# Patient Record
Sex: FEMALE | Race: BLACK OR AFRICAN AMERICAN | Employment: FULL TIME | URBAN - METROPOLITAN AREA
[De-identification: names, ages, dates, MRNs, and addresses within clinical notes are randomized per-mention and may not be internally consistent; named-entity substitution may affect disease eponyms.]

---

## 2021-03-23 ENCOUNTER — OFFICE VISIT (OUTPATIENT)
Dept: ENT CLINIC | Age: 31
End: 2021-03-23
Payer: COMMERCIAL

## 2021-03-23 VITALS
TEMPERATURE: 97.5 F | DIASTOLIC BLOOD PRESSURE: 80 MMHG | WEIGHT: 136 LBS | OXYGEN SATURATION: 98 % | HEIGHT: 63 IN | BODY MASS INDEX: 24.1 KG/M2 | RESPIRATION RATE: 18 BRPM | SYSTOLIC BLOOD PRESSURE: 122 MMHG | HEART RATE: 77 BPM

## 2021-03-23 DIAGNOSIS — J30.1 SEASONAL ALLERGIC RHINITIS DUE TO POLLEN: ICD-10-CM

## 2021-03-23 DIAGNOSIS — R43.8 HYPOGEUSIA: ICD-10-CM

## 2021-03-23 DIAGNOSIS — R43.8 HYPOSMIA: Primary | ICD-10-CM

## 2021-03-23 DIAGNOSIS — R09.81 NASAL CONGESTION: ICD-10-CM

## 2021-03-23 PROCEDURE — 31231 NASAL ENDOSCOPY DX: CPT | Performed by: OTOLARYNGOLOGY

## 2021-03-23 PROCEDURE — 99203 OFFICE O/P NEW LOW 30 MIN: CPT | Performed by: OTOLARYNGOLOGY

## 2021-03-23 RX ORDER — AMOXICILLIN AND CLAVULANATE POTASSIUM 875; 125 MG/1; MG/1
1 TABLET, FILM COATED ORAL 2 TIMES DAILY
Qty: 20 TAB | Refills: 0 | Status: SHIPPED | OUTPATIENT
Start: 2021-03-23 | End: 2021-04-02

## 2021-03-23 RX ORDER — PREDNISONE 10 MG/1
TABLET ORAL
Qty: 24 TAB | Refills: 0 | Status: SHIPPED | OUTPATIENT
Start: 2021-03-23 | End: 2021-04-27 | Stop reason: ALTCHOICE

## 2021-03-23 NOTE — PROGRESS NOTES
Otolaryngology-Head and Neck Surgery  New Patient Visit     Patient: Javier Davis  YOB: 1990  MRN: [id number]  Date of Service: 3/23/2021    Chief Complaint:  Hyposmia, nasal congestion     History of Present Illness: Javier Davis is a 28y.o. year old female who presents today for discussion of hyposmia and hypogeusia. She notes a history of allergies, worse in the last year. Generally she is able to take an antihistamine as needed and this manages her symptoms. Usually her allergies are worse in the spring and summer. No prior allergy testing    33 days ago she developed hyposmia and hypogeusia. She has tried continuing oral antihistamines and 2 weeks ago she started Flonase. She has not noticed any improvement. Has difficulty distinguishing even sweet from salty. She is able to appreciate some sour. Has bilateral nasal congestion. Has chronic rhinorrhea and postnasal drip. Denies URI. Denies trauma. Has had multiple Covid tests which have been negative    Past Medical History:   History reviewed. No pertinent past medical history. Past Surgical History:   History reviewed. No pertinent surgical history. Medications:   Denies    Allergies:   No Known Allergies    Social History:   Social History     Tobacco Use    Smoking status: Never Smoker    Smokeless tobacco: Never Used   Substance and Sexual Activity    Alcohol use: Yes    Drug use: Never       Family History:  Family History   Problem Relation Age of Onset    Diabetes Maternal Grandmother     Stroke Maternal Grandfather        Review of Systems:    Consitutional: denies fever, excessive weight gain or loss. Eyes: denies diplopia, eye pain. Integumentary: denies new concerning skin lesions. Ears, Nose, Mouth, Throat: denies except as per HPI.   Endocrine: denies hot or cold intolerance, increased thirst.  Respiratory: denies cough, hemoptysis, wheezing  Gastrointestinal: denies trouble swallowing, nausea, emesis, regurgitation  Musculoskeletal: denies muscle weakness or wasting  Cardiovascular: denies chest pain, shortness of breath  Neurologic: denies seizures, numbness or tingling, syncope  Hematologic: denies easy bleeding or bruising    Physical Examination:   Vitals:    03/23/21 1321   BP: 122/80   BP 1 Location: Right upper arm   BP Patient Position: Sitting   BP Cuff Size: Adult   Pulse: 77   Resp: 18   Temp: 97.5 °F (36.4 °C)   TempSrc: Temporal   SpO2: 98%   Weight: 136 lb (61.7 kg)   Height: 5' 3\" (1.6 m)        General: Comfortable, pleasant, appears stated age  Voice: Strong, speaking in full sentences, no stridor    Face: No masses or lesions, facial strength symmetric   Ears: External ears unremarkable. Bilateral ear canal clear. Tympanic membrane clear and intact, with visible landmarks. Clear middle ear space  Nose: External nose unremarkable. Dorsum midline. Anterior rhinoscopy demonstrates no lesions. R septal deviation with marked R nasal congestion and mucosal edema. Septum making contact with inferior turbinate. Does not respond well to decongestant. Oral Cavity / Oropharynx: No trismus. Mucosa pink and moist. No lesions. Tongue is midline and mobile. Palate elevates symmetrically. Uvula midline. Tonsils unremarkable. Base of tongue soft. Floor of mouth soft. Neck: Supple. No adenopathy. Thyroid unremarkable. Palpable laryngeal landmarks. Full neck range of motion   Neurologic: CN II - XI intact. Normal gait    PROCEDURE: NASAL ENDOSCOPY    Preoperative Diagnosis:  Hyposmia    Postoperative Diagnosis:Same     Procedure: Nasal Endoscopy    Anesthesia: Topical 4% Lidocaine, Oxymetazoline    Description of Procedure: Verbal informed consent was obtained. After application of topical anesthetic and decongestant, the endoscope was introduced to the patient's nare. It was passed through the nose and into the nasopharynx. The scope was then withdrawn and repeated on the opposing nare.  The patient tolerated the procedure well. Findings: Septum with R deviation. Severe R nasal congestion and mucosal edema. Difficult to pass even pediatric camera. Clear rhinorrhea. No obvious polyposis or purulence but full visualization difficult. L nasal cavity with clear rhinorrhea and post nasal drip. Normal nasopharynx. Middle meatus clear on left       Assessment and Plan:   1. Hyposmia  2. Hypogeusia  3. Nasal congestion  4. Allergic rhinitis  - Significant nasal congestion and mucosal edema R > L today   - Unable to fully visualize R nasal cavity despite decongestant and use of pediatric nasal scope  - Continue flonase  - Add prednisone  - counseled on risks  - Add augmentin in case of CRS  - Check post treatment sinus CT scan  - If smell returns and symptoms improve, ok to defer CT scan  - RTC in 4 weeks even if symptoms have improved so we can discuss additional allergy Rx and maybe rescope nose             The patient was instructed to return to clinic if no improvement or progression of symptoms. Signs to watch out for reviewed.       MD [facility]  [address] [place] [address] [address]  [facility]  Office Phone: [phone]

## 2021-03-23 NOTE — PROGRESS NOTES
Visit Vitals  /80 (BP 1 Location: Right upper arm, BP Patient Position: Sitting, BP Cuff Size: Adult)   Pulse 77   Resp 18   Ht 5' 3\" (1.6 m)   Wt 136 lb (61.7 kg)   SpO2 98%   BMI 24.09 kg/m²     Chief Complaint   Patient presents with    New Patient     lose of taste and smell, runny/ stuffy nose     Allergies

## 2021-03-30 ENCOUNTER — TELEPHONE (OUTPATIENT)
Dept: ENT CLINIC | Age: 31
End: 2021-03-30

## 2021-04-27 ENCOUNTER — OFFICE VISIT (OUTPATIENT)
Dept: ENT CLINIC | Age: 31
End: 2021-04-27
Payer: COMMERCIAL

## 2021-04-27 VITALS
OXYGEN SATURATION: 97 % | HEIGHT: 64 IN | SYSTOLIC BLOOD PRESSURE: 110 MMHG | HEART RATE: 96 BPM | RESPIRATION RATE: 16 BRPM | BODY MASS INDEX: 23.6 KG/M2 | TEMPERATURE: 97.5 F | DIASTOLIC BLOOD PRESSURE: 82 MMHG | WEIGHT: 138.2 LBS

## 2021-04-27 DIAGNOSIS — J30.1 SEASONAL ALLERGIC RHINITIS DUE TO POLLEN: ICD-10-CM

## 2021-04-27 DIAGNOSIS — R09.81 NASAL CONGESTION: ICD-10-CM

## 2021-04-27 DIAGNOSIS — R43.8 HYPOSMIA: Primary | ICD-10-CM

## 2021-04-27 PROCEDURE — 99213 OFFICE O/P EST LOW 20 MIN: CPT | Performed by: OTOLARYNGOLOGY

## 2021-04-27 RX ORDER — AZELASTINE 1 MG/ML
1 SPRAY, METERED NASAL 2 TIMES DAILY
Qty: 1 BOTTLE | Refills: 1 | Status: SHIPPED | OUTPATIENT
Start: 2021-04-27 | End: 2021-09-30 | Stop reason: SDUPTHER

## 2021-04-27 NOTE — PROGRESS NOTES
Chief Complaint   Patient presents with    Follow-up     Hyposmia     Visit Vitals  /82 (BP 1 Location: Left upper arm, BP Patient Position: Sitting, BP Cuff Size: Adult)   Pulse 96   Temp 97.5 °F (36.4 °C)   Resp 16   Ht 5' 4\" (1.626 m)   Wt 138 lb 3.2 oz (62.7 kg)   SpO2 97%   BMI 23.72 kg/m²     .

## 2021-04-27 NOTE — PROGRESS NOTES
Otolaryngology-Head and Neck Surgery  Follow Up Patient Visit     Patient: Itzel Rosa  YOB: 1990  MRN: [id number]  Date of Service: 4/27/2021    Chief Complaint:  Hyposmia, nasal congestion     Interval hx: Suki Hurst presents in follow up  Notes hyposmia and hypogeusia improved immediately after the nasal scope exam   Completed prednisone and augmentin which was helpful  Did not find flonase to be helpful, and no longer finds antihistamines helpful    History of Present Illness: Itzel Rosa is a 28y.o. year old female who presents today for discussion of hyposmia and hypogeusia. She notes a history of allergies, worse in the last year. Generally she is able to take an antihistamine as needed and this manages her symptoms. Usually her allergies are worse in the spring and summer. No prior allergy testing    33 days ago she developed hyposmia and hypogeusia. She has tried continuing oral antihistamines and 2 weeks ago she started Flonase. She has not noticed any improvement. Has difficulty distinguishing even sweet from salty. She is able to appreciate some sour. Has bilateral nasal congestion. Has chronic rhinorrhea and postnasal drip. Denies URI. Denies trauma. Has had multiple Covid tests which have been negative    Past Medical History:   History reviewed. No pertinent past medical history. Past Surgical History:   History reviewed. No pertinent surgical history. Medications:   Denies    Allergies:   No Known Allergies    Social History:   Social History     Socioeconomic History    Marital status:    Tobacco Use    Smoking status: Never Smoker    Smokeless tobacco: Never Used   Substance and Sexual Activity    Alcohol use:  Yes    Drug use: Never       Family History:  Family History   Problem Relation Age of Onset    Diabetes Maternal Grandmother     Stroke Maternal Grandfather        Review of Systems:    Consitutional: denies fever, excessive weight gain or loss. Eyes: denies diplopia, eye pain. Integumentary: denies new concerning skin lesions. Ears, Nose, Mouth, Throat: denies except as per HPI. Endocrine: denies hot or cold intolerance, increased thirst.  Respiratory: denies cough, hemoptysis, wheezing  Gastrointestinal: denies trouble swallowing, nausea, emesis, regurgitation  Musculoskeletal: denies muscle weakness or wasting  Cardiovascular: denies chest pain, shortness of breath  Neurologic: denies seizures, numbness or tingling, syncope  Hematologic: denies easy bleeding or bruising    Physical Examination:   Vitals:    04/27/21 0903   BP: 110/82   BP 1 Location: Left upper arm   BP Patient Position: Sitting   BP Cuff Size: Adult   Pulse: 96   Resp: 16   Temp: 97.5 °F (36.4 °C)   SpO2: 97%   Weight: 138 lb 3.2 oz (62.7 kg)   Height: 5' 4\" (1.626 m)        General: Comfortable, pleasant, appears stated age  Voice: Strong, speaking in full sentences, no stridor    Face: No masses or lesions, facial strength symmetric   Ears: External ears unremarkable. Bilateral ear canal clear. Tympanic membrane clear and intact, with visible landmarks. Clear middle ear space  Nose: External nose unremarkable. Dorsum midline. Anterior rhinoscopy demonstrates no lesions. R septal deviation with improved R nasal congestion and mucosal edema. Septum making contact with inferior turbinate. Overall improved from LOV  Oral Cavity / Oropharynx: No trismus. Mucosa pink and moist. No lesions. Tongue is midline and mobile. Palate elevates symmetrically. Uvula midline. Tonsils unremarkable. Base of tongue soft. Floor of mouth soft. Neck: Supple. No adenopathy. Thyroid unremarkable. Palpable laryngeal landmarks. Full neck range of motion   Neurologic: CN II - XI intact. Normal gait    Assessment and Plan:   1. Hyposmia  2. Hypogeusia  3. Nasal congestion  4.  Allergic rhinitis  - Significant nasal congestion and mucosal edema R > L today   - She's improved overall so we deferred CT scan   - Use nasal saline spray before bed and add astelin nasal spray  - Discussed allergy testing and role of immunotherapy - she'd like to hold on this for now  - Otherwise follow up in 2-3 months  - If allergies improved but nasal obstruction continues, consider septoplasty turbinate reduction             The patient was instructed to return to clinic if no improvement or progression of symptoms. Signs to watch out for reviewed.       Jarad Maya MD   [facility]  [address] [place] [address]  Office Phone: [phone]

## 2021-09-30 ENCOUNTER — OFFICE VISIT (OUTPATIENT)
Dept: ENT CLINIC | Age: 31
End: 2021-09-30
Payer: COMMERCIAL

## 2021-09-30 VITALS
DIASTOLIC BLOOD PRESSURE: 66 MMHG | HEART RATE: 82 BPM | OXYGEN SATURATION: 98 % | TEMPERATURE: 97.6 F | HEIGHT: 64 IN | WEIGHT: 142 LBS | SYSTOLIC BLOOD PRESSURE: 108 MMHG | BODY MASS INDEX: 24.24 KG/M2 | RESPIRATION RATE: 16 BRPM

## 2021-09-30 DIAGNOSIS — J32.4 CHRONIC PANSINUSITIS: ICD-10-CM

## 2021-09-30 DIAGNOSIS — R43.8 HYPOSMIA: Primary | ICD-10-CM

## 2021-09-30 PROCEDURE — 99213 OFFICE O/P EST LOW 20 MIN: CPT | Performed by: OTOLARYNGOLOGY

## 2021-09-30 RX ORDER — AZELASTINE 1 MG/ML
1 SPRAY, METERED NASAL 2 TIMES DAILY
Qty: 1 EACH | Refills: 1 | Status: SHIPPED | OUTPATIENT
Start: 2021-09-30 | End: 2022-05-11 | Stop reason: SDUPTHER

## 2021-09-30 NOTE — PROGRESS NOTES
Visit Vitals  /66 (BP 1 Location: Left upper arm, BP Patient Position: Sitting, BP Cuff Size: Adult)   Pulse 82   Temp 97.6 °F (36.4 °C) (Temporal)   Resp 16   Ht 5' 4\" (1.626 m)   Wt 142 lb (64.4 kg)   SpO2 98%   BMI 24.37 kg/m²   1. Have you been to the ER, urgent care clinic since your last visit? Hospitalized since your last visit? No    2. Have you seen or consulted any other health care providers outside of the [address] [place] [address] since your last visit? Include any pap smears or colon screening. Yes Where: ob/gyn doctor-Dr Shannon Gould Reason for visit: OB/GYN doctor-Dr Shannon Gould   Chief Complaint   Patient presents with    Sinus Infection     runny nose and congestion. Lost sense of taste and smell recently, but better today.

## 2021-09-30 NOTE — PROGRESS NOTES
Otolaryngology-Head and Neck Surgery  Follow Up Patient Visit     Patient: Digna Song  YOB: 1990  MRN: [id number]  Date of Service: 9/30/2021    Chief Complaint:  Hyposmia, nasal congestion     Interval hx: Amber Sharpe was previously seen a few months ago with chronic hyposmia, which improved with nasal endoscopy, as well as prednisone//augmentin    She notes generally doing well until she was on a flight a few weeks ago and again had sudden loss of smell  Had COVID testing which was negative    Continues to have nasal congestion, R > L  Taste and smell are since improving    History of Present Illness: Digna Song is a 28y.o. year old female who presents today for discussion of hyposmia and hypogeusia. She notes a history of allergies, worse in the last year. Generally she is able to take an antihistamine as needed and this manages her symptoms. Usually her allergies are worse in the spring and summer. No prior allergy testing    33 days ago she developed hyposmia and hypogeusia. She has tried continuing oral antihistamines and 2 weeks ago she started Flonase. She has not noticed any improvement. Has difficulty distinguishing even sweet from salty. She is able to appreciate some sour. Has bilateral nasal congestion. Has chronic rhinorrhea and postnasal drip. Denies URI. Denies trauma. Has had multiple Covid tests which have been negative    Past Medical History:   No past medical history on file. Past Surgical History:   No past surgical history on file. Medications:   Denies    Allergies:   No Known Allergies    Social History:   Social History     Socioeconomic History    Marital status:    Tobacco Use    Smoking status: Never Smoker    Smokeless tobacco: Never Used   Substance and Sexual Activity    Alcohol use:  Yes    Drug use: Never       Family History:  Family History   Problem Relation Age of Onset    Diabetes Maternal Grandmother     Stroke Maternal Grandfather        Review of Systems:    Consitutional: denies fever, excessive weight gain or loss. Eyes: denies diplopia, eye pain. Integumentary: denies new concerning skin lesions. Ears, Nose, Mouth, Throat: denies except as per HPI. Endocrine: denies hot or cold intolerance, increased thirst.  Respiratory: denies cough, hemoptysis, wheezing  Gastrointestinal: denies trouble swallowing, nausea, emesis, regurgitation  Musculoskeletal: denies muscle weakness or wasting  Cardiovascular: denies chest pain, shortness of breath  Neurologic: denies seizures, numbness or tingling, syncope  Hematologic: denies easy bleeding or bruising    Physical Examination:   Vitals:    09/30/21 1020   BP: 108/66   BP 1 Location: Left upper arm   BP Patient Position: Sitting   BP Cuff Size: Adult   Pulse: 82   Temp: 97.6 °F (36.4 °C)   TempSrc: Temporal   Resp: 16   Height: 5' 4\" (1.626 m)   Weight: 142 lb (64.4 kg)   SpO2: 98%        General: Comfortable, pleasant, appears stated age  Voice: Strong, speaking in full sentences, no stridor    Face: No masses or lesions, facial strength symmetric   Ears: External ears unremarkable. Bilateral ear canal clear. Tympanic membrane clear and intact, with visible landmarks. Clear middle ear space  Nose: External nose unremarkable. Dorsum midline. Anterior rhinoscopy demonstrates no lesions. R septal deviation with improved R nasal congestion and mucosal edema. Septum making contact with inferior turbinate. Oral Cavity / Oropharynx: No trismus. Mucosa pink and moist. No lesions. Tongue is midline and mobile. Palate elevates symmetrically. Uvula midline. Tonsils unremarkable. Base of tongue soft. Floor of mouth soft. Neck: Supple. No adenopathy. Thyroid unremarkable. Palpable laryngeal landmarks. Full neck range of motion   Neurologic: CN II - XI intact. Normal gait    Assessment and Plan:   1. Hyposmia  2. Hypogeusia  3. Nasal congestion  4.  Allergic rhinitis  - R septal deviation with nasal congestion and turbinate hypertrophy  - Cont PRN nasal saline and astelin  - Discussed given recurrence of hyposmia despite above, and as she's already had PO antibiotics/steroids, will check CT sinus  - Discussed allergy testing and role of immunotherapy - which she will consider           The patient was instructed to return to clinic if no improvement or progression of symptoms. Signs to watch out for reviewed.       MD [facility]  [address] [place] [address] [address]  [facility]  Office Phone: [phone]

## 2022-05-11 ENCOUNTER — TELEPHONE (OUTPATIENT)
Dept: ENT CLINIC | Age: 32
End: 2022-05-11

## 2022-05-11 DIAGNOSIS — J30.1 SEASONAL ALLERGIC RHINITIS DUE TO POLLEN: Primary | ICD-10-CM

## 2022-05-11 RX ORDER — AZELASTINE 1 MG/ML
1 SPRAY, METERED NASAL 2 TIMES DAILY
Qty: 1 EACH | Refills: 1 | Status: SHIPPED | OUTPATIENT
Start: 2022-05-11

## 2022-05-11 NOTE — TELEPHONE ENCOUNTER
Pt called to make an appt with Dr. Gabino Ballard. She states that she is still having the same issue that she was previously seen for. Lot of congestion, allergy issues and difficulty breathing. The pt made an appt for next available and then was also added to the cancellation list.   However, the pt was wondering if there was a medication she could use in the meantime. She states that Dr. Gabino Ballard prescribed a nasal spray previously and she was wondering if she could fill that RX again to try in the meantime. Please advise.

## 2022-06-27 ENCOUNTER — OFFICE VISIT (OUTPATIENT)
Dept: ENT CLINIC | Age: 32
End: 2022-06-27
Payer: COMMERCIAL

## 2022-06-27 VITALS
WEIGHT: 142 LBS | BODY MASS INDEX: 24.24 KG/M2 | DIASTOLIC BLOOD PRESSURE: 60 MMHG | RESPIRATION RATE: 16 BRPM | HEART RATE: 70 BPM | TEMPERATURE: 97 F | SYSTOLIC BLOOD PRESSURE: 100 MMHG | OXYGEN SATURATION: 100 % | HEIGHT: 64 IN

## 2022-06-27 DIAGNOSIS — R43.8 HYPOSMIA: Primary | ICD-10-CM

## 2022-06-27 DIAGNOSIS — J30.1 SEASONAL ALLERGIC RHINITIS DUE TO POLLEN: ICD-10-CM

## 2022-06-27 DIAGNOSIS — J32.4 CHRONIC PANSINUSITIS: ICD-10-CM

## 2022-06-27 DIAGNOSIS — R09.81 NASAL CONGESTION: ICD-10-CM

## 2022-06-27 PROCEDURE — 99213 OFFICE O/P EST LOW 20 MIN: CPT | Performed by: OTOLARYNGOLOGY

## 2022-06-27 RX ORDER — PREDNISONE 10 MG/1
TABLET ORAL
Qty: 24 TABLET | Refills: 0 | Status: SHIPPED | OUTPATIENT
Start: 2022-06-27 | End: 2022-08-01 | Stop reason: ALTCHOICE

## 2022-06-27 RX ORDER — CETIRIZINE HCL 10 MG
10 TABLET ORAL DAILY
Qty: 90 TABLET | Refills: 1 | Status: SHIPPED | OUTPATIENT
Start: 2022-06-27 | End: 2022-08-22 | Stop reason: SDUPTHER

## 2022-06-27 RX ORDER — AMOXICILLIN AND CLAVULANATE POTASSIUM 875; 125 MG/1; MG/1
1 TABLET, FILM COATED ORAL 2 TIMES DAILY
Qty: 20 TABLET | Refills: 0 | Status: SHIPPED | OUTPATIENT
Start: 2022-06-27 | End: 2022-07-07

## 2022-06-27 NOTE — PROGRESS NOTES
Otolaryngology-Head and Neck Surgery  Follow Up Patient Visit     Patient: Nathen Moran  YOB: 1990  MRN: [id number]  Date of Service: 6/27/2022    Chief Complaint:  Hyposmia, nasal congestion       Interval hx:   6 weeks ago or so, worsening nasal congestion   Not able to breathe at night causing anxiety   Dec smell , taste again   Using astelin for the last few weeks with some improvement - using PRN     Interval hx: Cooper Melara was previously seen a few months ago with chronic hyposmia, which improved with nasal endoscopy, as well as prednisone//augmentin    She notes generally doing well until she was on a flight a few weeks ago and again had sudden loss of smell  Had COVID testing which was negative    Continues to have nasal congestion, R > L  Taste and smell are since improving    History of Present Illness: Nathen Moran is a 33y.o. year old female who presents today for discussion of hyposmia and hypogeusia. She notes a history of allergies, worse in the last year. Generally she is able to take an antihistamine as needed and this manages her symptoms. Usually her allergies are worse in the spring and summer. No prior allergy testing    33 days ago she developed hyposmia and hypogeusia. She has tried continuing oral antihistamines and 2 weeks ago she started Flonase. She has not noticed any improvement. Has difficulty distinguishing even sweet from salty. She is able to appreciate some sour. Has bilateral nasal congestion. Has chronic rhinorrhea and postnasal drip. Denies URI. Denies trauma. Has had multiple Covid tests which have been negative    Past Medical History:   History reviewed. No pertinent past medical history. Past Surgical History:   History reviewed. No pertinent surgical history.     Medications:   Denies    Allergies:   No Known Allergies    Social History:   Social History     Socioeconomic History    Marital status:    Tobacco Use    Smoking status: Never Smoker    Smokeless tobacco: Never Used   Substance and Sexual Activity    Alcohol use: Yes    Drug use: Never       Family History:  Family History   Problem Relation Age of Onset    Diabetes Maternal Grandmother     Stroke Maternal Grandfather        Review of Systems:    Consitutional: denies fever, excessive weight gain or loss. Eyes: denies diplopia, eye pain. Integumentary: denies new concerning skin lesions. Ears, Nose, Mouth, Throat: denies except as per HPI. Endocrine: denies hot or cold intolerance, increased thirst.  Respiratory: denies cough, hemoptysis, wheezing  Gastrointestinal: denies trouble swallowing, nausea, emesis, regurgitation  Musculoskeletal: denies muscle weakness or wasting  Cardiovascular: denies chest pain, shortness of breath  Neurologic: denies seizures, numbness or tingling, syncope  Hematologic: denies easy bleeding or bruising    Physical Examination:   Vitals:    06/27/22 1621   BP: 100/60   BP 1 Location: Right upper arm   BP Patient Position: Sitting   BP Cuff Size: Large adult   Pulse: 70   Temp: 97 °F (36.1 °C)   TempSrc: Temporal   Resp: 16   Height: 5' 4\" (1.626 m)   Weight: 142 lb (64.4 kg)   SpO2: 100%        General: Comfortable, pleasant, appears stated age  Voice: Strong, speaking in full sentences, no stridor    Face: No masses or lesions, facial strength symmetric   Ears: External ears unremarkable. Bilateral ear canal clear. Tympanic membrane clear and intact, with visible landmarks. Clear middle ear space  Nose: External nose unremarkable. Dorsum midline. Anterior rhinoscopy demonstrates no lesions. Bilateral inferior turbinate hypertrophy and mucosal edema  Oral Cavity / Oropharynx: No trismus. Mucosa pink and moist. No lesions. Tongue is midline and mobile. Palate elevates symmetrically. Uvula midline. Tonsils unremarkable. Base of tongue soft. Floor of mouth soft. Neck: Supple. No adenopathy. Thyroid unremarkable.  Palpable laryngeal landmarks. Full neck range of motion   Neurologic: CN II - XI intact. Normal gait    Assessment and Plan:   1. Hyposmia  2. Hypogeusia  3. Nasal congestion  4. Allergic rhinitis  - Use nasal saline PRN   - Use astelin daily  - Add PO prednisone, augmentin given severity of symptoms  - Check CT sinus   - Pending above, consider allergy testing and IT if CT negative   - Can also consider in office turb reduction, possible vivaer procedure           The patient was instructed to return to clinic if no improvement or progression of symptoms. Signs to watch out for reviewed.       MD [facility]  [address] [place] [address]  [facility]  Office Phone: [phone]

## 2022-07-13 ENCOUNTER — HOSPITAL ENCOUNTER (OUTPATIENT)
Dept: CT IMAGING | Age: 32
Discharge: HOME | End: 2022-07-13
Attending: OTOLARYNGOLOGY
Payer: COMMERCIAL

## 2022-07-13 DIAGNOSIS — R43.8 HYPOSMIA: ICD-10-CM

## 2022-07-13 DIAGNOSIS — J32.4 CHRONIC PANSINUSITIS: ICD-10-CM

## 2022-07-13 PROCEDURE — 70486 CT MAXILLOFACIAL W/O DYE: CPT

## 2022-07-15 ENCOUNTER — TELEPHONE (OUTPATIENT)
Dept: ENT CLINIC | Age: 32
End: 2022-07-15

## 2022-07-15 NOTE — TELEPHONE ENCOUNTER
I tried calling the patient with results. Left VM reminding patient of her follow up appointment in 2 weeks. Advised patient that she can call back to the office if she wants results before her next appointment.

## 2022-07-15 NOTE — TELEPHONE ENCOUNTER
----- Message from Bethel Fajardo MD sent at 7/15/2022 11:29 AM EDT -----  Can you let pt know sinus CT shows mild sinus inflammation but overall looks pretty good  She has an appt with me in a coule weeks to go over things further and discuss options for next steps      Thanks  SL  ----- Message -----  From: Enoc Vaca Results In  Sent: 7/13/2022   1:25 PM EDT  To: Bethel Fajardo MD

## 2022-07-27 ENCOUNTER — TELEPHONE (OUTPATIENT)
Dept: ENT CLINIC | Age: 32
End: 2022-07-27

## 2022-07-27 NOTE — TELEPHONE ENCOUNTER
LVM asking pt to call the office and r/s appt due to Dr. Caty Valdes having an emergency at the hospital

## 2022-07-29 ENCOUNTER — TELEPHONE (OUTPATIENT)
Dept: ENT CLINIC | Age: 32
End: 2022-07-29

## 2022-07-29 NOTE — TELEPHONE ENCOUNTER
Attempted to reach [facility] to confirm next appointment and left a voicemail asking the patient to call back to confirm.

## 2022-08-01 ENCOUNTER — OFFICE VISIT (OUTPATIENT)
Dept: ENT CLINIC | Age: 32
End: 2022-08-01
Payer: COMMERCIAL

## 2022-08-01 VITALS
HEIGHT: 64 IN | SYSTOLIC BLOOD PRESSURE: 112 MMHG | BODY MASS INDEX: 26.46 KG/M2 | HEART RATE: 88 BPM | DIASTOLIC BLOOD PRESSURE: 70 MMHG | OXYGEN SATURATION: 98 % | WEIGHT: 155 LBS | RESPIRATION RATE: 16 BRPM

## 2022-08-01 DIAGNOSIS — J34.2 NASAL SEPTAL DEVIATION: ICD-10-CM

## 2022-08-01 DIAGNOSIS — R09.81 NASAL CONGESTION: ICD-10-CM

## 2022-08-01 DIAGNOSIS — H61.22 LEFT EAR IMPACTED CERUMEN: ICD-10-CM

## 2022-08-01 DIAGNOSIS — R43.8 HYPOSMIA: Primary | ICD-10-CM

## 2022-08-01 PROCEDURE — 99213 OFFICE O/P EST LOW 20 MIN: CPT | Performed by: OTOLARYNGOLOGY

## 2022-08-01 NOTE — PROGRESS NOTES
Otolaryngology-Head and Neck Surgery  Follow Up Patient Visit     Patient: Allan Devi  YOB: 1990  MRN: [id number]  Date of Service: 8/1/2022    Chief Complaint:  Hyposmia, nasal congestion     Interval hx: 8/1/2022  Here to follow up after sinus CT  Feels back to normal, able to breathe and smell comfortably     Interval hx: 6/27/2022  6 weeks ago or so, worsening nasal congestion   Not able to breathe at night causing anxiety   Dec smell , taste again   Using astelin for the last few weeks with some improvement - using PRN     Interval hx: 9/2021  Lucrecia Giles was previously seen a few months ago with chronic hyposmia, which improved with nasal endoscopy, as well as prednisone//augmentin    She notes generally doing well until she was on a flight a few weeks ago and again had sudden loss of smell  Had COVID testing which was negative    Continues to have nasal congestion, R > L  Taste and smell are since improving    History of Present Illness: Allan Devi is a 33y.o. year old female who presents today for discussion of hyposmia and hypogeusia. She notes a history of allergies, worse in the last year. Generally she is able to take an antihistamine as needed and this manages her symptoms. Usually her allergies are worse in the spring and summer. No prior allergy testing    33 days ago she developed hyposmia and hypogeusia. She has tried continuing oral antihistamines and 2 weeks ago she started Flonase. She has not noticed any improvement. Has difficulty distinguishing even sweet from salty. She is able to appreciate some sour. Has bilateral nasal congestion. Has chronic rhinorrhea and postnasal drip. Denies URI. Denies trauma. Has had multiple Covid tests which have been negative    Past Medical History:   No past medical history on file. Past Surgical History:   No past surgical history on file.     Medications:   Denies    Allergies:   No Known Allergies    Social History:   Social History     Socioeconomic History    Marital status:    Tobacco Use    Smoking status: Never Smoker    Smokeless tobacco: Never Used   Substance and Sexual Activity    Alcohol use: Yes    Drug use: Never       Family History:  Family History   Problem Relation Age of Onset    Diabetes Maternal Grandmother     Stroke Maternal Grandfather        Review of Systems:    Consitutional: denies fever, excessive weight gain or loss. Eyes: denies diplopia, eye pain. Integumentary: denies new concerning skin lesions. Ears, Nose, Mouth, Throat: denies except as per HPI. Endocrine: denies hot or cold intolerance, increased thirst.  Respiratory: denies cough, hemoptysis, wheezing  Gastrointestinal: denies trouble swallowing, nausea, emesis, regurgitation  Musculoskeletal: denies muscle weakness or wasting  Cardiovascular: denies chest pain, shortness of breath  Neurologic: denies seizures, numbness or tingling, syncope  Hematologic: denies easy bleeding or bruising    Physical Examination:   There were no vitals filed for this visit. General: Comfortable, pleasant, appears stated age  Voice: Strong, speaking in full sentences, no stridor    Face: No masses or lesions, facial strength symmetric   Ears: External ears unremarkable. Left cerumen impaction, following debridement, Tympanic membrane clear and intact, with visible landmarks. Clear middle ear space  Nose: External nose unremarkable. Dorsum midline. Anterior rhinoscopy demonstrates no lesions. Bilateral inferior turbinate hypertrophy and mucosal edema  Oral Cavity / Oropharynx: No trismus. Mucosa pink and moist. No lesions. Tongue is midline and mobile. Palate elevates symmetrically. Uvula midline. Tonsils unremarkable. Base of tongue soft. Floor of mouth soft. Neck: Supple. No adenopathy. Thyroid unremarkable. Palpable laryngeal landmarks. Full neck range of motion   Neurologic: CN II - XI intact.  Normal gait      CT sinus IMPRESSION  1. Minimal mucosal thickening in the maxillary sinuses. Otherwise clear  paranasal sinuses. 2. Rightward deviated nasal septum. PROCEDURE: BILATERAL MICROSCOPY WITH CERUMEN DEBRIDEMENT    Using a headlight and otoscope, both ears were examined. A 5 Fr suction and alligator were used to debride the ears of cerumen revealing a clear and intact TM bilaterally. Patient tolerated the procedure well     Assessment and Plan:   Hyposmia  Hypogeusia  Nasal congestion  Allergic rhinitis  Left cerumen impaction   - Use nasal saline PRN   - Use astelin daily  - Reviewed sinus CT results  - Discussed options of in office turbinate reduction, septoplasty, turbinate reduction and maxilary antrostomy, as well as allergy testing  - For now as she is doing well, will cont astelin  - If recurring flare ups discussed need for additional steps  - Left ear debrided of cerumen     The patient was instructed to return to clinic if no improvement or progression of symptoms. Signs to watch out for reviewed.       MD Matt Cardenasova [facility]  [address] [place] [address] 6  [place]  Office Phone: [phone]

## 2022-08-01 NOTE — PROGRESS NOTES
Visit Vitals  /70 (BP 1 Location: Left upper arm, BP Patient Position: Sitting, BP Cuff Size: Adult)   Pulse 88   Resp 16   Ht 5' 4\" (1.626 m)   Wt 155 lb (70.3 kg)   SpO2 98%   BMI 26.61 kg/m²     Chief Complaint   Patient presents with    Follow-up     Talk on CT Results / Hyposmia

## 2022-08-22 DIAGNOSIS — R09.81 NASAL CONGESTION: Primary | ICD-10-CM

## 2022-08-22 RX ORDER — CETIRIZINE HCL 10 MG
10 TABLET ORAL DAILY
Qty: 90 TABLET | Refills: 1 | Status: SHIPPED | OUTPATIENT
Start: 2022-08-22

## 2022-11-02 ENCOUNTER — HOSPITAL ENCOUNTER (EMERGENCY)
Age: 32
Discharge: HOME | End: 2022-11-02
Attending: EMERGENCY MEDICINE
Payer: COMMERCIAL

## 2022-11-02 VITALS
RESPIRATION RATE: 22 BRPM | WEIGHT: 142 LBS | BODY MASS INDEX: 24.24 KG/M2 | TEMPERATURE: 98.2 F | OXYGEN SATURATION: 99 % | DIASTOLIC BLOOD PRESSURE: 74 MMHG | HEIGHT: 64 IN | HEART RATE: 79 BPM | SYSTOLIC BLOOD PRESSURE: 118 MMHG

## 2022-11-02 DIAGNOSIS — R51.9 ACUTE NONINTRACTABLE HEADACHE, UNSPECIFIED HEADACHE TYPE: Primary | ICD-10-CM

## 2022-11-02 LAB
ALBUMIN SERPL-MCNC: 4.4 G/DL (ref 3.5–5)
ALBUMIN/GLOB SERPL: 1.5 {RATIO} (ref 1.1–2.2)
ALP SERPL-CCNC: 40 U/L (ref 45–117)
ALT SERPL-CCNC: 18 U/L (ref 12–78)
ANION GAP SERPL CALC-SCNC: 7 MMOL/L (ref 5–15)
ARTERIAL PATENCY WRIST A: ABNORMAL
AST SERPL W P-5'-P-CCNC: 16 U/L (ref 15–37)
BASE EXCESS BLDA CALC-SCNC: 2.1 MMOL/L (ref 0–3)
BASOPHILS # BLD: 0 K/UL (ref 0–0.1)
BASOPHILS NFR BLD: 1 % (ref 0–1)
BDY SITE: ABNORMAL
BILIRUB SERPL-MCNC: 0.4 MG/DL (ref 0.2–1)
BODY TEMPERATURE: 97.8
BUN SERPL-MCNC: 9 MG/DL (ref 6–20)
BUN/CREAT SERPL: 11 (ref 12–20)
CA-I BLD-MCNC: 9.1 MG/DL (ref 8.5–10.1)
CHLORIDE SERPL-SCNC: 106 MMOL/L (ref 97–108)
CO2 SERPL-SCNC: 26 MMOL/L (ref 21–32)
COHGB MFR BLD: 0.9 % (ref 1–2)
CREAT SERPL-MCNC: 0.82 MG/DL (ref 0.55–1.02)
DIFFERENTIAL METHOD BLD: ABNORMAL
EOSINOPHIL # BLD: 0.2 K/UL (ref 0–0.4)
EOSINOPHIL NFR BLD: 3 % (ref 0–7)
ERYTHROCYTE [DISTWIDTH] IN BLOOD BY AUTOMATED COUNT: 11.2 % (ref 11.5–14.5)
GLOBULIN SER CALC-MCNC: 3 G/DL (ref 2–4)
GLUCOSE SERPL-MCNC: 106 MG/DL (ref 65–100)
HCG UR QL: NEGATIVE
HCO3 BLDA-SCNC: 28 MMOL/L (ref 22–26)
HCT VFR BLD AUTO: 36.3 % (ref 35–47)
HGB BLD-MCNC: 12.1 G/DL (ref 11.5–16)
IMM GRANULOCYTES # BLD AUTO: 0 K/UL (ref 0–0.04)
IMM GRANULOCYTES NFR BLD AUTO: 0 % (ref 0–0.5)
LYMPHOCYTES # BLD: 1.9 K/UL (ref 0.8–3.5)
LYMPHOCYTES NFR BLD: 32 % (ref 12–49)
MCH RBC QN AUTO: 32.3 PG (ref 26–34)
MCHC RBC AUTO-ENTMCNC: 33.3 G/DL (ref 30–36.5)
MCV RBC AUTO: 96.8 FL (ref 80–99)
METHGB MFR BLD: 0.4 % (ref 0–1.4)
MONOCYTES # BLD: 0.3 K/UL (ref 0–1)
MONOCYTES NFR BLD: 5 % (ref 5–13)
NEUTS SEG # BLD: 3.4 K/UL (ref 1.8–8)
NEUTS SEG NFR BLD: 59 % (ref 32–75)
NRBC # BLD: 0 K/UL (ref 0–0.01)
NRBC BLD-RTO: 0 PER 100 WBC
OXYHGB MFR BLD: 97.3 % (ref 95–99)
PCO2 BLDA: 45 MMHG (ref 35–45)
PERFORMED BY, TECHID: ABNORMAL
PH BLDA: 7.4 [PH] (ref 7.35–7.45)
PLATELET # BLD AUTO: 337 K/UL (ref 150–400)
PMV BLD AUTO: 8.8 FL (ref 8.9–12.9)
PO2 BLDA: 174 MMHG (ref 80–100)
POTASSIUM SERPL-SCNC: 3.4 MMOL/L (ref 3.5–5.1)
PROT SERPL-MCNC: 7.4 G/DL (ref 6.4–8.2)
RBC # BLD AUTO: 3.75 M/UL (ref 3.8–5.2)
SAO2 % BLD: 99 % (ref 95–99)
SAO2% DEVICE SAO2% SENSOR NAME: ABNORMAL
SODIUM SERPL-SCNC: 139 MMOL/L (ref 136–145)
SPECIMEN SITE: ABNORMAL
WBC # BLD AUTO: 5.8 K/UL (ref 3.6–11)

## 2022-11-02 PROCEDURE — 82803 BLOOD GASES ANY COMBINATION: CPT

## 2022-11-02 PROCEDURE — 99283 EMERGENCY DEPT VISIT LOW MDM: CPT

## 2022-11-02 PROCEDURE — 81025 URINE PREGNANCY TEST: CPT

## 2022-11-02 PROCEDURE — 85025 COMPLETE CBC W/AUTO DIFF WBC: CPT

## 2022-11-02 PROCEDURE — 80053 COMPREHEN METABOLIC PANEL: CPT

## 2022-11-02 PROCEDURE — 36415 COLL VENOUS BLD VENIPUNCTURE: CPT

## 2022-11-02 NOTE — ED TRIAGE NOTES
Pt states her gas stove was on for an unknown amount of time. States she woke up feeling light headed and had a headache. States she would like to be checked for carbon monoxide poisoning.

## 2022-11-03 NOTE — ED PROVIDER NOTES
EMERGENCY DEPARTMENT HISTORY AND PHYSICAL EXAM      Date: 11/2/2022  Patient Name: [facility]    History of Presenting Illness     Chief Complaint   Patient presents with    Headache       History Provided By: Patient    HPI: [facility], 33 y.o. female with no significant past medical history presenting to the emergency department for evaluation of right sided frontal headache starting this morning. Patient states that she noted the gas on her stove to be running. She is unsure how long it was on. Patient does not live with anyone at home. Reports associated nausea, however denies any other symptoms. States that she has taken over-the-counter medication with moderate improvement of her symptoms, however is concerned about carbon monoxide exposure. There are no other complaints, changes, or physical findings at this time. PCP: None    No current facility-administered medications on file prior to encounter. Current Outpatient Medications on File Prior to Encounter   Medication Sig Dispense Refill    cetirizine (ZYRTEC) 10 mg tablet Take 1 Tablet by mouth daily. 90 Tablet 1    azelastine (ASTELIN) 137 mcg (0.1 %) nasal spray 1 [place] by Both Nostrils route two (2) times a day. Use in each nostril as directed 1 Each 1       Past History     Past Medical History:  History reviewed. No pertinent past medical history. Past Surgical History:  History reviewed. No pertinent surgical history. Family History:  Family History   Problem Relation Age of Onset    Diabetes Maternal Grandmother     Stroke Maternal Grandfather        Social History:  Social History     Tobacco Use    Smoking status: Never    Smokeless tobacco: Never   Vaping Use    Vaping Use: Never used   Substance Use Topics    Alcohol use: Yes    Drug use: Never       Allergies:  No Known Allergies    Review of Systems   Review of Systems   Constitutional:  Negative for chills and fever.    HENT:  Negative for congestion and sore throat. Eyes:  Negative for pain and visual disturbance. Respiratory:  Negative for cough and shortness of breath. Cardiovascular:  Negative for chest pain and palpitations. Gastrointestinal:  Positive for nausea. Negative for constipation, diarrhea and vomiting. Genitourinary:  Negative for dysuria and frequency. Musculoskeletal:  Negative for arthralgias and myalgias. Skin:  Negative for color change and rash. Neurological:  Positive for headaches. Negative for dizziness and weakness. Psychiatric/Behavioral:  Negative for dysphoric mood and sleep disturbance. Physical Exam   Physical Exam  Constitutional:       Appearance: Normal appearance. HENT:      Head: Normocephalic and atraumatic. Right Ear: External ear normal.      Left Ear: External ear normal.      Nose: Nose normal.      Mouth/Throat:      Mouth: Mucous membranes are moist.   Eyes:      Extraocular Movements: Extraocular movements intact. Conjunctiva/sclera: Conjunctivae normal.   Cardiovascular:      Rate and Rhythm: Normal rate and regular rhythm. Pulses: Normal pulses. Heart sounds: Normal heart sounds. Pulmonary:      Effort: Pulmonary effort is normal.      Breath sounds: Normal breath sounds. Abdominal:      General: Abdomen is flat. There is no distension. Palpations: Abdomen is soft. Tenderness: There is no abdominal tenderness. Musculoskeletal:         General: Normal range of motion. Cervical back: Normal range of motion. Skin:     General: Skin is warm and dry. Capillary Refill: Capillary refill takes less than 2 seconds. Neurological:      General: No focal deficit present. Mental Status: She is alert and oriented to person, place, and time. Mental status is at baseline.    Psychiatric:         Mood and Affect: Mood normal.         Behavior: Behavior normal.       Lab and Diagnostic Study Results   Labs -   No results found for this or any previous visit (from the past 12 hour(s)). Radiologic Studies -   @lastxrresult@  CT Results  (Last 48 hours)      None          CXR Results  (Last 48 hours)      None            Medical Decision Making and ED Course   Differential Diagnosis & Medical Decision Making Provider Note:   44-year-old female presenting for evaluation of headache in the setting of possible her monoxide exposure. Patient states that headache is improved significantly over the course of the day after over-the-counter medications. Patient has no findings on neuro exam.  She is placed on nonrebreather on arrival to the emergency department. CBC, CMP, carboxyhemoglobin levels without significant abnormality. hCG negative. Patient with complete resolution of symptoms while in the emergency department. Stable for discharge home at this time. - I am the first provider for this patient. I reviewed the vital signs, available nursing notes, past medical history, past surgical history, family history and social history. The patients presenting problems have been discussed, and they are in agreement with the care plan formulated and outlined with them. I have encouraged them to ask questions as they arise throughout their visit. Vital Signs-Reviewed the patient's vital signs. No data found. ED Course:          Procedures   Performed by: Anastasiia Berger DO  Procedures      Disposition   Disposition: Condition stable  DC- Adult Discharges: All of the diagnostic tests were reviewed and questions answered. Diagnosis, care plan and treatment options were discussed. The patient understands the instructions and will follow up as directed. The patients results have been reviewed with them. They have been counseled regarding their diagnosis. The patient verbally convey understanding and agreement of the signs, symptoms, diagnosis, treatment and prognosis and additionally agrees to follow up as recommended with their PCP in 24 - 48 hours.   They also agree with the care-plan and convey that all of their questions have been answered. I have also put together some discharge instructions for them that include: 1) educational information regarding their diagnosis, 2) how to care for their diagnosis at home, as well a 3) list of reasons why they would want to return to the ED prior to their follow-up appointment, should their condition change. DC-The patient was given verbal follow-up instructions    DISCHARGE PLAN:  1. Cannot display discharge medications since this patient is not currently admitted. 2.   Follow-up Information       Follow up With Specialties Details Why [facility] EMERGENCY DEPT Emergency Medicine  As needed, If symptoms worsen [address] [facility] [address]  [phone]          3. Return to ED if worse   4. Discharge Medication List as of 11/2/2022 10:02 PM         Remove if admitted/transferred    Diagnosis/Clinical Impression     Clinical Impression:   1. Acute nonintractable headache, unspecified headache type        Attestations: Adams Crigler, , am the primary clinician of record. Please note that this dictation was completed with [email or web address], the [employer] voice recognition software. Quite often unanticipated grammatical, syntax, homophones, and other interpretive errors are inadvertently transcribed by the computer software. Please disregard these errors. Please excuse any errors that have escaped final proofreading. Thank you.

## 2022-11-03 NOTE — DISCHARGE INSTRUCTIONS
Thank you! Thank you for allowing me to care for you in the emergency department. It is my goal to provide you with excellent care. If you have not received excellent quality care, please ask to speak to the nurse manager. Please fill out the survey that will come to you by mail or email since we listen to your feedback! Below you will find a list of your tests from today's visit. Should you have any questions, please do not hesitate to call the emergency department. Labs  Recent Results (from the past 12 hour(s))   CBC WITH AUTOMATED DIFF    Collection Time: 11/02/22  7:58 PM   Result Value Ref Range    WBC 5.8 3.6 - 11.0 K/uL    RBC 3.75 (L) 3.80 - 5.20 M/uL    HGB 12.1 11.5 - 16.0 g/dL    HCT 36.3 35.0 - 47.0 %    MCV 96.8 80.0 - 99.0 FL    MCH 32.3 26.0 - 34.0 PG    MCHC 33.3 30.0 - 36.5 g/dL    RDW 11.2 (L) 11.5 - 14.5 %    PLATELET 794 043 - 691 K/uL    MPV 8.8 (L) 8.9 - 12.9 FL    NRBC 0.0 0.0  WBC    ABSOLUTE NRBC 0.00 0.00 - 0.01 K/uL    NEUTROPHILS 59 32 - 75 %    LYMPHOCYTES 32 12 - 49 %    MONOCYTES 5 5 - 13 %    EOSINOPHILS 3 0 - 7 %    BASOPHILS 1 0 - 1 %    IMMATURE GRANULOCYTES 0 0 - 0.5 %    ABS. NEUTROPHILS 3.4 1.8 - 8.0 K/UL    ABS. LYMPHOCYTES 1.9 0.8 - 3.5 K/UL    ABS. MONOCYTES 0.3 0.0 - 1.0 K/UL    ABS. EOSINOPHILS 0.2 0.0 - 0.4 K/UL    ABS. BASOPHILS 0.0 0.0 - 0.1 K/UL    ABS. IMM. GRANS. 0.0 0.00 - 0.04 K/UL    DF AUTOMATED     METABOLIC PANEL, COMPREHENSIVE    Collection Time: 11/02/22  7:58 PM   Result Value Ref Range    Sodium 139 136 - 145 mmol/L    Potassium 3.4 (L) 3.5 - 5.1 mmol/L    Chloride 106 97 - 108 mmol/L    CO2 26 21 - 32 mmol/L    Anion gap 7 5 - 15 mmol/L    Glucose 106 (H) 65 - 100 mg/dL    BUN 9 6 - 20 mg/dL    Creatinine 0.82 0.55 - 1.02 mg/dL    BUN/Creatinine ratio 11 (L) 12 - 20      eGFR >60 >60 ml/min/1.73m2    Calcium 9.1 8.5 - 10.1 mg/dL    Bilirubin, total 0.4 0.2 - 1.0 mg/dL    AST (SGOT) 16 15 - 37 U/L    ALT (SGPT) 18 12 - 78 U/L    Alk. phosphatase 40 (L) 45 - 117 U/L    Protein, total 7.4 6.4 - 8.2 g/dL    Albumin 4.4 3.5 - 5.0 g/dL    Globulin 3.0 2.0 - 4.0 g/dL    A-G Ratio 1.5 1.1 - 2.2     HCG URINE, QL    Collection Time: 11/02/22  8:13 PM   Result Value Ref Range    HCG urine, QL Negative Negative     BLOOD GAS, ARTERIAL    Collection Time: 11/02/22  9:52 PM   Result Value Ref Range    pH 7.40 7.35 - 7.45      PCO2 45 35 - 45 mmHg    PO2 174 (H) 80 - 100 mmHg    O2 SATURATION 99 95 - 99 %    BICARBONATE 28 (H) 22 - 26 mmol/L    BASE EXCESS 2.1 0 - 3 mmol/L    O2 METHOD Room air      Sample source Arterial      SITE OTHER      OTILIA'S TEST NOT APPLICABLE      Carboxy-Hgb 0.9 (L) 1 - 2 %    Methemoglobin 0.4 0 - 1.4 %    Oxyhemoglobin 97.3 95 - 99 %    Performed by Belia Jason     TEMPERATURE 97.8         Radiologic Studies  No orders to display     CT Results  (Last 48 hours)      None          CXR Results  (Last 48 hours)      None          ------------------------------------------------------------------------------------------------------------  The exam and treatment you received in the Emergency Department were for an urgent problem and are not intended as complete care. It is important that you follow-up with a doctor, nurse practitioner, or physician assistant to:  (1) confirm your diagnosis,  (2) re-evaluation of changes in your illness and treatment, and  (3) for ongoing care. Please take your discharge instructions with you when you go to your follow-up appointment. If you have any problem arranging a follow-up appointment, contact the Emergency Department. If your symptoms become worse or you do not improve as expected and you are unable to reach your health care provider, please return to the Emergency Department. We are available 24 hours a day. If a prescription has been provided, please have it filled as soon as possible to prevent a delay in treatment.  If you have any questions or reservations about taking the medication due to side effects or interactions with other medications, please call your primary care provider or contact the ER.

## 2023-02-06 ENCOUNTER — OFFICE VISIT (OUTPATIENT)
Dept: ENT CLINIC | Age: 33
End: 2023-02-06
Payer: COMMERCIAL

## 2023-02-06 VITALS
BODY MASS INDEX: 24.75 KG/M2 | OXYGEN SATURATION: 98 % | HEART RATE: 81 BPM | RESPIRATION RATE: 19 BRPM | WEIGHT: 145 LBS | DIASTOLIC BLOOD PRESSURE: 78 MMHG | HEIGHT: 64 IN | SYSTOLIC BLOOD PRESSURE: 120 MMHG

## 2023-02-06 DIAGNOSIS — J30.1 SEASONAL ALLERGIC RHINITIS DUE TO POLLEN: ICD-10-CM

## 2023-02-06 DIAGNOSIS — R09.81 NASAL CONGESTION: ICD-10-CM

## 2023-02-06 DIAGNOSIS — J34.2 NASAL SEPTAL DEVIATION: Primary | ICD-10-CM

## 2023-02-06 PROCEDURE — 99213 OFFICE O/P EST LOW 20 MIN: CPT | Performed by: OTOLARYNGOLOGY

## 2023-02-06 RX ORDER — ESTRADIOL 2 MG/1
TABLET ORAL
COMMUNITY
Start: 2023-01-30

## 2023-02-06 RX ORDER — CETIRIZINE HYDROCHLORIDE 10 MG/1
10 TABLET ORAL DAILY
Qty: 90 TABLET | Refills: 2 | Status: SHIPPED | OUTPATIENT
Start: 2023-02-06

## 2023-02-06 NOTE — PROGRESS NOTES
Otolaryngology-Head and Neck Surgery  Follow Up Patient Visit     Patient: Iwona Thornotn  YOB: 1990  MRN: [id number]  Date of Service: 2/6/2023    Chief Complaint:  Hyposmia, nasal congestion     Interval hx: 2/6/2023  No issues, doing well   Feels back to her baseline  Using zyrtec daily, not really needing astelin so much     Interval hx: 8/2022  Here to follow up after sinus CT  Feels back to normal, able to breathe and smell comfortably     Interval hx: 6/27/2022  6 weeks ago or so, worsening nasal congestion   Not able to breathe at night causing anxiety   Dec smell , taste again   Using astelin for the last few weeks with some improvement - using PRN     Interval hx: 9/2021  Wendy Call was previously seen a few months ago with chronic hyposmia, which improved with nasal endoscopy, as well as prednisone//augmentin    She notes generally doing well until she was on a flight a few weeks ago and again had sudden loss of smell  Had COVID testing which was negative    Continues to have nasal congestion, R > L  Taste and smell are since improving    History of Present Illness: Iwona Thornton is a 33y.o. year old female who presents today for discussion of hyposmia and hypogeusia. She notes a history of allergies, worse in the last year. Generally she is able to take an antihistamine as needed and this manages her symptoms. Usually her allergies are worse in the spring and summer. No prior allergy testing    33 days ago she developed hyposmia and hypogeusia. She has tried continuing oral antihistamines and 2 weeks ago she started Flonase. She has not noticed any improvement. Has difficulty distinguishing even sweet from salty. She is able to appreciate some sour. Has bilateral nasal congestion. Has chronic rhinorrhea and postnasal drip. Denies URI. Denies trauma.     Has had multiple Covid tests which have been negative    Past Medical History:   No past medical history on file.    Past Surgical History:   No past surgical history on file. Medications:   Denies    Allergies:   No Known Allergies    Social History:   Social History     Socioeconomic History    Marital status:    Tobacco Use    Smoking status: Never Smoker    Smokeless tobacco: Never Used   Substance and Sexual Activity    Alcohol use: Yes    Drug use: Never       Family History:  Family History   Problem Relation Age of Onset    Diabetes Maternal Grandmother     Stroke Maternal Grandfather        Review of Systems:    Consitutional: denies fever, excessive weight gain or loss. Eyes: denies diplopia, eye pain. Integumentary: denies new concerning skin lesions. Ears, Nose, Mouth, Throat: denies except as per HPI. Endocrine: denies hot or cold intolerance, increased thirst.  Respiratory: denies cough, hemoptysis, wheezing  Gastrointestinal: denies trouble swallowing, nausea, emesis, regurgitation  Musculoskeletal: denies muscle weakness or wasting  Cardiovascular: denies chest pain, shortness of breath  Neurologic: denies seizures, numbness or tingling, syncope  Hematologic: denies easy bleeding or bruising    Physical Examination:   There were no vitals filed for this visit. General: Comfortable, pleasant, appears stated age  Voice: Strong, speaking in full sentences, no stridor    Face: No masses or lesions, facial strength symmetric   Ears: External ears unremarkable. Bilateral ear canal clear, Tympanic membrane clear and intact, with visible landmarks. Clear middle ear space  Nose: External nose unremarkable. Dorsum midline. Anterior rhinoscopy demonstrates no lesions. Bilateral inferior turbinate hypertrophy and mucosal edema  Oral Cavity / Oropharynx: No trismus. Mucosa pink and moist. No lesions. Tongue is midline and mobile. Palate elevates symmetrically. Uvula midline. Tonsils unremarkable. Base of tongue soft. Floor of mouth soft. Neck: Supple. No adenopathy. Thyroid unremarkable.  Palpable laryngeal landmarks. Full neck range of motion   Neurologic: CN II - XI intact. Normal gait      CT sinus   IMPRESSION  1. Minimal mucosal thickening in the maxillary sinuses. Otherwise clear  paranasal sinuses. 2. Rightward deviated nasal septum. Assessment and Plan:   Hyposmia  Hypogeusia  Nasal congestion  Allergic rhinitis  - Use nasal saline PRN   - Use astelin PRN  - Discussed options of alternate medication management, allergy testing and possibly IT,  in office turbinate reduction, or surgery to include septoplasty, turbinate reduction and maxilary antrostomy  - For now as she is doing well, will cont zyrtec daily  - Follow up PRN    The patient was instructed to return to clinic if no improvement or progression of symptoms. Signs to watch out for reviewed.       Maxx Rucker MD   [facility]  [address] [place] [address] [address]  [facility]  Office Phone: [phone]

## 2023-05-25 RX ORDER — ESTRADIOL 2 MG/1
1 TABLET ORAL 3 TIMES DAILY
COMMUNITY
Start: 2023-01-30

## 2023-05-25 RX ORDER — CETIRIZINE HYDROCHLORIDE 10 MG/1
10 TABLET ORAL DAILY
COMMUNITY
Start: 2023-02-06

## 2023-05-25 RX ORDER — AZELASTINE 1 MG/ML
1 SPRAY, METERED NASAL 2 TIMES DAILY
COMMUNITY
Start: 2022-05-11